# Patient Record
Sex: FEMALE | URBAN - NONMETROPOLITAN AREA
[De-identification: names, ages, dates, MRNs, and addresses within clinical notes are randomized per-mention and may not be internally consistent; named-entity substitution may affect disease eponyms.]

---

## 2017-03-15 ENCOUNTER — APPOINTMENT (RX ONLY)
Dept: URBAN - NONMETROPOLITAN AREA CLINIC 22 | Facility: CLINIC | Age: 72
Setting detail: DERMATOLOGY
End: 2017-03-15

## 2017-03-15 DIAGNOSIS — L29.89 OTHER PRURITUS: ICD-10-CM

## 2017-03-15 DIAGNOSIS — D485 NEOPLASM OF UNCERTAIN BEHAVIOR OF SKIN: ICD-10-CM

## 2017-03-15 DIAGNOSIS — L29.8 OTHER PRURITUS: ICD-10-CM

## 2017-03-15 PROBLEM — I10 ESSENTIAL (PRIMARY) HYPERTENSION: Status: ACTIVE | Noted: 2017-03-15

## 2017-03-15 PROBLEM — D48.5 NEOPLASM OF UNCERTAIN BEHAVIOR OF SKIN: Status: ACTIVE | Noted: 2017-03-15

## 2017-03-15 PROBLEM — E78.5 HYPERLIPIDEMIA, UNSPECIFIED: Status: ACTIVE | Noted: 2017-03-15

## 2017-03-15 PROCEDURE — ? COUNSELING

## 2017-03-15 PROCEDURE — ? TREATMENT REGIMEN

## 2017-03-15 PROCEDURE — 99202 OFFICE O/P NEW SF 15 MIN: CPT

## 2017-03-15 PROCEDURE — ? PRESCRIPTION

## 2017-03-15 RX ORDER — DIPHENHYDRAMINE HCL 2 %
25MG CREAM (GRAM) TOPICAL BID
Qty: 30 | Refills: 0 | Status: ERX | COMMUNITY
Start: 2017-03-15

## 2017-03-15 RX ORDER — DESONIDE 0.5 MG/G
0.05% CREAM TOPICAL BID
Qty: 2 | Refills: 0 | Status: ERX | COMMUNITY
Start: 2017-03-15

## 2017-03-15 RX ADMIN — Medication 25MG: at 13:57

## 2017-03-15 RX ADMIN — DESONIDE 0.05%: 0.5 CREAM TOPICAL at 13:45

## 2017-03-15 ASSESSMENT — LOCATION ZONE DERM: LOCATION ZONE: FACE

## 2017-03-15 ASSESSMENT — LOCATION DETAILED DESCRIPTION DERM: LOCATION DETAILED: RIGHT SUPERIOR CENTRAL BUCCAL CHEEK

## 2017-03-15 ASSESSMENT — LOCATION SIMPLE DESCRIPTION DERM: LOCATION SIMPLE: RIGHT CHEEK

## 2017-03-15 NOTE — PROCEDURE: TREATMENT REGIMEN
Plan: Recommended for patient to begin treatment as directed today. Informed patient to refrain from scratching lesion
Plan: Recommended for patient to begin treatment as directed today. Encourage patient to refrain from manipulating area. Informed patient that once lesion resolve residual hyperpigmentation may remain and will take several months to resolve. Informed her that if lesion has not improved at follow other treatment options may need to be pursued
Initiate Treatment: Benadryl 25mg - 1 PO Daily
Detail Level: Zone
Initiate Treatment: Desonide 0.05% cream - Apply to affected area on the right cheek BID X 4 days \\nBenadryl 25mg - 1 PO QHS

## 2017-03-15 NOTE — HPI: EVALUATION OF SKIN LESION(S)
Hpi Title: Evaluation of Skin Lesions
How Severe Are Your Spot(S)?: mild
Have Your Spot(S) Been Treated In The Past?: has not been treated
Additional History: Patient reports that this lesion first appeared a week ago and is very itchy. She states that the itching is worse at night. She states that the lesion swells and gets tender. She reports that she has never had a full body skin exam. However, she denies to have a full exam done today she would only like for this lesion to be evaluated.

## 2017-04-13 ENCOUNTER — RX ONLY (OUTPATIENT)
Age: 72
Setting detail: RX ONLY
End: 2017-04-13

## 2017-04-13 ENCOUNTER — APPOINTMENT (RX ONLY)
Dept: URBAN - NONMETROPOLITAN AREA CLINIC 22 | Facility: CLINIC | Age: 72
Setting detail: DERMATOLOGY
End: 2017-04-13

## 2017-04-13 DIAGNOSIS — L29.8 OTHER PRURITUS: ICD-10-CM

## 2017-04-13 DIAGNOSIS — L29.89 OTHER PRURITUS: ICD-10-CM

## 2017-04-13 DIAGNOSIS — L82.0 INFLAMED SEBORRHEIC KERATOSIS: ICD-10-CM

## 2017-04-13 PROCEDURE — ? COUNSELING

## 2017-04-13 PROCEDURE — ? PRESCRIPTION

## 2017-04-13 PROCEDURE — ? TREATMENT REGIMEN

## 2017-04-13 PROCEDURE — 99212 OFFICE O/P EST SF 10 MIN: CPT

## 2017-04-13 RX ORDER — DESONIDE 0.5 MG/G
0.05% CREAM TOPICAL BID
Qty: 2 | Refills: 0 | Status: ERX

## 2017-04-13 RX ORDER — HYDROCORTISONE 1 %
1% CREAM (GRAM) TOPICAL BID
Qty: 1 | Refills: 2 | Status: ERX | COMMUNITY
Start: 2017-04-13

## 2017-04-13 RX ADMIN — Medication 1%: at 14:40

## 2017-04-13 ASSESSMENT — LOCATION ZONE DERM: LOCATION ZONE: FACE

## 2017-04-13 ASSESSMENT — LOCATION SIMPLE DESCRIPTION DERM: LOCATION SIMPLE: RIGHT CHEEK

## 2017-04-13 ASSESSMENT — LOCATION DETAILED DESCRIPTION DERM: LOCATION DETAILED: RIGHT SUPERIOR CENTRAL BUCCAL CHEEK

## 2017-04-13 NOTE — PROCEDURE: TREATMENT REGIMEN
Plan: Recommended for patient to continue treatment as previously directed today. Encouraged patient to continue to refrain from manipulating area
Detail Level: Zone
Continue Regimen: Desonide 0.05% cream - Apply to affected area on the right cheek BID X 4 days \\nBenadryl 25mg - 1 PO QHS
Initiate Treatment: Hydrocortisone 1% cream - Apply to affected area on the right cheek BID 14 days per month
Continue Regimen: Benadryl 25mg - 1 PO Daily
Plan: Recommended for patient to begin treatment as directed today. Informed patient to continue to refrain from scratching lesion